# Patient Record
Sex: MALE | Race: OTHER
[De-identification: names, ages, dates, MRNs, and addresses within clinical notes are randomized per-mention and may not be internally consistent; named-entity substitution may affect disease eponyms.]

---

## 2021-01-25 ENCOUNTER — HOSPITAL ENCOUNTER (EMERGENCY)
Dept: HOSPITAL 77 - KA.ED | Age: 21
Discharge: NURSING, REHAB OR LONG-TERM CARE | End: 2021-01-25
Payer: COMMERCIAL

## 2021-01-25 VITALS — HEART RATE: 82 BPM | DIASTOLIC BLOOD PRESSURE: 85 MMHG | SYSTOLIC BLOOD PRESSURE: 138 MMHG
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
## 2021-01-25 DIAGNOSIS — S82.842A: Primary | ICD-10-CM

## 2021-01-25 RX ADMIN — HYDROMORPHONE HYDROCHLORIDE ONE: 2 INJECTION INTRAMUSCULAR; INTRAVENOUS; SUBCUTANEOUS at 16:55

## 2021-01-25 RX ADMIN — ONDANSETRON ONE MG: 2 INJECTION, SOLUTION INTRAMUSCULAR; INTRAVENOUS at 15:55

## 2021-01-25 NOTE — EDM.PDOC
ED HPI GENERAL MEDICAL PROBLEM





- General


Stated Complaint: ANKLE INJURY


Time Seen by Provider: 01/25/21 15:31


Source of Information: Reports: Patient, Other (boss)


History Limitations: Reports: No Limitations





- History of Present Illness


INITIAL COMMENTS - FREE TEXT/NARRATIVE: 





Patient presents with left ankle injury and severe pain after falling on the ice

at work at around 1315 this afternoon.  He describes what sounds like a forceful

hyper-plantar-flexion injury as he landed with his foot and ankle underneath 

him.  He denies hitting his head or any other injuries or LOC.  He has only had 

water intake since an energy drink at 0700 this morning.  


  ** Left Ankle


Pain Score (Numeric/FACES): 10





- Related Data


                                    Allergies











Allergy/AdvReac Type Severity Reaction Status Date / Time


 


No Known Drug Allergies Allergy  Cannot Verified 01/25/21 15:27





   Remember  











Home Meds: 


                                    Home Meds





. [No Known Home Meds]  01/25/21 [History]











Review of Systems





- Review of Systems


Review Of Systems: See Below


Constitutional: Denies: Chills, Fever


Eyes: Denies: Blurred Vision, Vision Change


Ears: Denies: Dizziness, Bloody Discharge


Nose: Denies: Bloody Discharge


Mouth/Throat: Denies: Bleeding


Respiratory: Denies: Shortness of Breath, Cough


Cardiovascular: Denies: Chest Pain, Syncope


GI/Abdominal: Denies: Abdominal Pain, Vomiting


Musculoskeletal: Reports: Other (ankle).  Denies: Neck Pain, Shoulder Pain, Arm 

Pain, Back Pain, Hand Pain


Skin: Reports: Bruising (left ankle).  Denies: Cyanosis, Jaundice, Mottled, 

Pallor, Diaphoresis


Neurological: Denies: Confusion, Dizziness, Headache, Seizure, Syncope, Trouble 

Speaking


Psychiatric: Denies: Confusion, Depression





ED EXAM, GENERAL





- Physical Exam


Exam: See Below


Exam Limited By: No Limitations


General Appearance: Alert, WD/WN, No Apparent Distress


Eye Exam: Bilateral Eye: EOMI, Normal Inspection, PERRL


Ears: Normal External Exam, Hearing Grossly Normal


Nose: Normal Inspection, No Blood


Throat/Mouth: Normal Inspection, Normal Lips, Normal Voice, No Airway Compromise


Head: Atraumatic, Normocephalic


Neck: Normal Inspection, Supple, Non-Tender, Full Range of Motion.  No: Tender 

Lateral, Tender Midline


Respiratory/Chest: No Respiratory Distress, Lungs Clear, Normal Breath Sounds, 

No Accessory Muscle Use


Cardiovascular: Normal Peripheral Pulses, Regular Rate, Rhythm, No Edema


Peripheral Pulses: 2+: Posterior Tibial (L), Dorsalis Pedis (L)


GI/Abdominal: No Distention


Back Exam: Normal Inspection, Full Range of Motion


Extremities: No Pedal Edema (but moderate swelling and ecchymosis of left 

ankle), Normal Capillary Refill, Joint Swelling, Other (left ankle appears 

mildly displaced laterally with significant ecchymosis anteriorly, medially and 

laterally.  EHL/FHL are intact. CMS intact distally (before and after splint 

application).  Other extremities have no evidence of injury with full AROM 

without pain.)


Neurological: Alert, Oriented, CN II-XII Intact, Normal Cognition, No 

Motor/Sensory Deficits


Psychiatric: Normal Affect, Normal Mood


Skin Exam: Warm, Dry, Intact, Normal Color (except injury site), No Rash





ED TRAUMA EXTREMITY PROCEDURES





- Joint Reduction


  ** Left Ankle


Sedation: Other (dilaudid IV)


Pre-Procedure NV Status: Normal


Post-Procedure NV Status: Normal


Technique: Traction/Counter Traction (with direct lateral pressure)


Number of Attempts: 1


Post-Reduction Imaging: Acceptably Reduced (no post-reduction xrays done; 

patient will be getting ORIF within a few hours.  NV intact.)





- Splinting


  ** Left Lower Extremity


Splint Site: left ankle


Pre-Procedure NV Status: Normal


Post-Procedure NV Status: Normal


Splint Material: Fiberglass


Splint Design: Posterior


Applied & Form Fitted By: Provider


Provider Post-Splint Application NV Check: NV Status Normal


Complications: No





Course





- Orders/Labs/Meds


Meds: 





Medications














Discontinued Medications














Generic Name Dose Route Start Last Admin





  Trade Name Yariel  PRN Reason Stop Dose Admin


 


Hydromorphone HCl  0.5 mg  01/25/21 15:28 





  Dilaudid  IVPUSH  01/25/21 15:29 





  ONETIME ONE  


 


Hydromorphone HCl  1 mg  01/25/21 15:49 





  Dilaudid  IVPUSH  01/25/21 15:50 





  ONETIME ONE  


 


Ondansetron HCl  4 mg  01/25/21 15:49  01/25/21 15:55





  Zofran  IVPUSH  01/25/21 15:50  4 mg





  ONETIME ONE   Administration














- Re-Assessments/Exams


Free Text/Narrative Re-Assessment/Exam: 





01/25/21 16:16


Xrays show a bimalleolar fracture/dislocation of left ankle.  CMS is intact.  

Pain is improved with the Dilaudid.  Discussed findings and recommendations with

 patient and discussed with his mother (in [place] via phone).  They want to 

go to [facility].  The [facility] orthopedist looked at xrays and wants him to 

come through ER there.  I discussed case with Dr. Oneil, ER who accepted 

for transfer. He also asked if I could reduce the dislocation as much as 

tolerated.  This was done while applying splint.  Patient tolerated it okay 

after second dose of Dilaudid.  Will send patient via ambulance. 





Departure





- Departure


Time of Disposition: 16:20


Disposition: DC/[facility]fer to [facility]


Condition: Good


Clinical Impression: 


Displaced bimalleolar fracture of left ankle


Qualifiers:


 Encounter type: initial encounter Fracture type: closed Qualified Code(s): 

S82.842A - Displaced bimalleolar fracture of left lower leg, initial encounter 

for closed fracture








- Discharge Information


Referrals: 


Katerina Maravilla MD [Primary Care Provider] -